# Patient Record
Sex: FEMALE | Race: WHITE | Employment: UNEMPLOYED | URBAN - METROPOLITAN AREA
[De-identification: names, ages, dates, MRNs, and addresses within clinical notes are randomized per-mention and may not be internally consistent; named-entity substitution may affect disease eponyms.]

---

## 2017-03-08 ENCOUNTER — OFFICE VISIT (OUTPATIENT)
Dept: ORTHOPEDIC SURGERY | Age: 32
End: 2017-03-08

## 2017-03-08 VITALS — HEIGHT: 68 IN | WEIGHT: 240 LBS | BODY MASS INDEX: 36.37 KG/M2

## 2017-03-08 DIAGNOSIS — M25.572 ACUTE LEFT ANKLE PAIN: Primary | ICD-10-CM

## 2017-03-08 PROCEDURE — 99214 OFFICE O/P EST MOD 30 MIN: CPT | Performed by: INTERNAL MEDICINE

## 2017-03-08 PROCEDURE — L4387 NON-PNEUM WALK BOOT PRE OTS: HCPCS | Performed by: INTERNAL MEDICINE

## 2017-03-08 PROCEDURE — 73610 X-RAY EXAM OF ANKLE: CPT | Performed by: INTERNAL MEDICINE

## 2017-03-08 RX ORDER — AZELASTINE HYDROCHLORIDE, FLUTICASONE PROPIONATE 137; 50 UG/1; UG/1
SPRAY, METERED NASAL
COMMUNITY

## 2017-03-08 RX ORDER — CETIRIZINE HYDROCHLORIDE 10 MG/1
10 TABLET ORAL DAILY
COMMUNITY

## 2017-03-08 RX ORDER — MIDODRINE HYDROCHLORIDE 2.5 MG/1
2.5 TABLET ORAL 3 TIMES DAILY
COMMUNITY

## 2017-03-08 RX ORDER — MONTELUKAST SODIUM 10 MG/1
10 TABLET ORAL NIGHTLY
COMMUNITY

## 2017-03-08 RX ORDER — FEXOFENADINE HCL 180 MG/1
180 TABLET ORAL DAILY
COMMUNITY

## 2017-03-09 ENCOUNTER — TELEPHONE (OUTPATIENT)
Dept: ORTHOPEDIC SURGERY | Age: 32
End: 2017-03-09

## 2017-04-10 ENCOUNTER — OFFICE VISIT (OUTPATIENT)
Dept: ORTHOPEDIC SURGERY | Age: 32
End: 2017-04-10

## 2017-04-10 VITALS — HEIGHT: 69 IN | WEIGHT: 240 LBS | BODY MASS INDEX: 35.55 KG/M2

## 2017-04-10 DIAGNOSIS — M25.372 INSTABILITY OF ANKLE JOINT, LEFT: ICD-10-CM

## 2017-04-10 DIAGNOSIS — S82.839A AVULSION FRACTURE OF DISTAL FIBULA: ICD-10-CM

## 2017-04-10 PROBLEM — S93.409A GRADE 2 ANKLE SPRAIN: Status: ACTIVE | Noted: 2017-04-10

## 2017-04-10 PROCEDURE — L1902 AFO ANKLE GAUNTLET PRE OTS: HCPCS | Performed by: INTERNAL MEDICINE

## 2017-04-10 PROCEDURE — 99213 OFFICE O/P EST LOW 20 MIN: CPT | Performed by: INTERNAL MEDICINE

## 2017-04-27 ENCOUNTER — HOSPITAL ENCOUNTER (OUTPATIENT)
Dept: MRI IMAGING | Age: 32
Discharge: HOME | End: 2017-04-27

## 2017-04-27 DIAGNOSIS — M25.372 INSTABILITY OF ANKLE JOINT, LEFT: ICD-10-CM

## 2017-04-27 DIAGNOSIS — S82.839A AVULSION FRACTURE OF DISTAL FIBULA: ICD-10-CM

## 2017-05-17 ENCOUNTER — OFFICE VISIT (OUTPATIENT)
Dept: ORTHOPEDIC SURGERY | Age: 32
End: 2017-05-17

## 2017-05-17 VITALS — BODY MASS INDEX: 35.55 KG/M2 | HEIGHT: 69 IN | WEIGHT: 240 LBS

## 2017-05-17 DIAGNOSIS — S82.839A AVULSION FRACTURE OF DISTAL FIBULA: ICD-10-CM

## 2017-05-17 PROCEDURE — 99213 OFFICE O/P EST LOW 20 MIN: CPT | Performed by: INTERNAL MEDICINE

## 2018-05-30 ENCOUNTER — TELEPHONE (OUTPATIENT)
Dept: ORTHOPEDIC SURGERY | Age: 33
End: 2018-05-30

## 2024-12-23 ENCOUNTER — OFFICE VISIT (OUTPATIENT)
Dept: SURGERY | Age: 39
End: 2024-12-23
Payer: COMMERCIAL

## 2024-12-23 VITALS
WEIGHT: 179 LBS | OXYGEN SATURATION: 98 % | BODY MASS INDEX: 27.13 KG/M2 | SYSTOLIC BLOOD PRESSURE: 121 MMHG | TEMPERATURE: 98.4 F | HEIGHT: 68 IN | DIASTOLIC BLOOD PRESSURE: 86 MMHG | HEART RATE: 74 BPM

## 2024-12-23 DIAGNOSIS — C50.912 MALIGNANT NEOPLASM OF LEFT FEMALE BREAST, UNSPECIFIED ESTROGEN RECEPTOR STATUS, UNSPECIFIED SITE OF BREAST (HCC): Primary | ICD-10-CM

## 2024-12-23 PROCEDURE — 99205 OFFICE O/P NEW HI 60 MIN: CPT | Performed by: SURGERY

## 2024-12-23 NOTE — PROGRESS NOTES
20 mg by mouth as needed.      pregabalin (LYRICA) 150 MG capsule Take 150 mg by mouth 2 times daily 150mg in am, 300mg bedtime      furosemide (LASIX) 20 MG tablet Take 20 mg by mouth as needed.      Lidocaine (LIDODERM EX) Apply  topically as needed.      Armodafinil (NUVIGIL) 250 MG TABS Take 250 mg by mouth daily       lubiprostone (AMITIZA) 24 MCG capsule Take 24 mcg by mouth 2 times daily.      Sucralfate (CARAFATE PO) Take  by mouth 3 times daily.      SUMAtriptan Succinate (IMITREX PO) Take 100 mg by mouth as needed       dicyclomine (BENTYL) 20 MG tablet Take 1 tablet by mouth 3 times daily as needed. 30 tablet 5    Esomeprazole Magnesium (NEXIUM PO) Take 40 mg by mouth 2 times daily.      NADOLOL PO Take 40 mg by mouth nightly.      Ondansetron HCl (ZOFRAN PO) Take 4 mg by mouth as needed        No current facility-administered medications for this visit.     SocHx: Smoking: No    ETOH: none    Drug use: No    ROS   Constitutional: Negative for chills and fever.   HENT: Negative for congestion, facial swelling, and voice change.    Eyes: Negative for photophobia and visual disturbance.   Respiratory: Negative for apnea, cough, chest tightness and shortness of breath.    Cardiovascular: Negative for chest pain and palpitations.   Gastrointestinal: Negative for dysphagia and early satiety.  Genitourinary: Negative for difficulty urinating, dysuria, flank pain, frequency and hematuria.   Musculoskeletal: Negative for new gait problem, joint swelling and myalgias.   Skin: Negative for color change, pallor and rash.   Endocrine: negative for tremors, temperature intolerance or polydipsia.  Allergic/Immunologic: Negative for new environmental or food allergies.  Neurological: Negative for dizziness, seizures, speech difficulty, numbness.   Hematological: Negative for adenopathy.   Psychiatric/Behavioral: Negative for agitation and confusion.      EXAM  /86   Pulse 74   Temp 98.4 °F (36.9 °C)   Ht 1.727

## 2025-06-04 ENCOUNTER — OFFICE VISIT (OUTPATIENT)
Dept: SURGERY | Age: 40
End: 2025-06-04
Payer: COMMERCIAL

## 2025-06-04 VITALS
OXYGEN SATURATION: 98 % | BODY MASS INDEX: 30.71 KG/M2 | SYSTOLIC BLOOD PRESSURE: 119 MMHG | DIASTOLIC BLOOD PRESSURE: 86 MMHG | TEMPERATURE: 97.8 F | WEIGHT: 202 LBS | HEART RATE: 92 BPM

## 2025-06-04 DIAGNOSIS — C50.912 MALIGNANT NEOPLASM OF LEFT FEMALE BREAST, UNSPECIFIED ESTROGEN RECEPTOR STATUS, UNSPECIFIED SITE OF BREAST (HCC): Primary | ICD-10-CM

## 2025-06-04 PROCEDURE — 99214 OFFICE O/P EST MOD 30 MIN: CPT | Performed by: SURGERY

## 2025-06-04 NOTE — PROGRESS NOTES
[facility]    CC: Breast CA     Referring physician: Shilpa Mitchell    HPI: This is a 39 y.o. female with a PMHx as delineated below who presents in consultation for breast reconstruction. She was found to have left breast cancer and underwent bilateral mastectomies (3/24) with delayed bilateral TRAMs (4/24). She was having issues with wound healing in both her abdomen as well as the breasts. She was found to have significant fat necrosis of both the donor and breasts requiring takeback and debridement (10/24). She notes that there is still additional fat necrosis. She notes that she has 2 issues remaining: the contour as well as the persistent fat necrosis.  Plastic surgery was consulted for evaluation and treatment.    Since her last evaluation, she presents back for discussion.     The specifics of her breast cancer workup / treatment is as follows:     Diagnosis: invasive ductal  Mo/Yr Diagnosed: 5/23  Breast Involved:Left  Surgery: Bilateral mastectomies with TRAM reconstruction  Date of Surgery: 4/24  Stage: 1A  Toshia status: Positive  ER: Positive MT: Positive HER2: Positive    Post Op Plan:  Oncologist: Musa Zuleta  Radiation: None    Chemo/Meds: Completed neoadjuant chemotherapy (2/24)  Pregnancy/Miscarriages: 1 / 0    PMHx:   Past Medical History:   Diagnosis Date    Anxiety     Arthritis     Decreased lung capacity     Fibromyalgia 2010    GERD (gastroesophageal reflux disease)     Kidney stone     Nausea & vomiting     Postural orthostatic tachycardia syndrome    EDS (Hypermobile)    PSHx:   Past Surgical History:   Procedure Laterality Date    CHOLECYSTECTOMY  2011    COLONOSCOPY  3/14/12    and EGD    CYSTOSCOPY  2011    kidney stone    ENDOMETRIAL ABLATION      HERNIA REPAIR  1985    LAPAROSCOPY  2010    X 2    SINUS SURGERY      UPPER GASTROINTESTINAL ENDOSCOPY  4/15/2015    esophagitis, GERD, gastroparesis     Allergies:   Allergies   Allergen Reactions    Latex Rash